# Patient Record
Sex: FEMALE | Race: WHITE | NOT HISPANIC OR LATINO | URBAN - METROPOLITAN AREA
[De-identification: names, ages, dates, MRNs, and addresses within clinical notes are randomized per-mention and may not be internally consistent; named-entity substitution may affect disease eponyms.]

---

## 2024-11-11 ENCOUNTER — TELEPHONE (OUTPATIENT)
Dept: OTOLARYNGOLOGY | Facility: HOSPITAL | Age: 84
End: 2024-11-11

## 2024-11-11 NOTE — TELEPHONE ENCOUNTER
Called patient regarding referral for cochlear implant consult. Left message for patient to contact me directly for scheduling.

## 2024-11-12 ENCOUNTER — TELEPHONE (OUTPATIENT)
Dept: OTOLARYNGOLOGY | Facility: HOSPITAL | Age: 84
End: 2024-11-12

## 2024-11-13 ENCOUNTER — TELEPHONE (OUTPATIENT)
Dept: OTOLARYNGOLOGY | Facility: HOSPITAL | Age: 84
End: 2024-11-13

## 2024-11-13 NOTE — TELEPHONE ENCOUNTER
Called patient regarding referral for cochlear implant consult. Left message for patient to contact me directly for scheduling.       X3 attempts made with no return calls. No other form of contact available to reach patient. Will await return call prior to scheduling.

## 2024-11-15 DIAGNOSIS — H90.3 BILATERAL SENSORINEURAL HEARING LOSS: ICD-10-CM

## 2024-12-02 ENCOUNTER — HOSPITAL ENCOUNTER (OUTPATIENT)
Dept: RADIOLOGY | Facility: CLINIC | Age: 84
Discharge: HOME | End: 2024-12-02
Payer: MEDICARE

## 2024-12-02 DIAGNOSIS — H90.3 BILATERAL SENSORINEURAL HEARING LOSS: ICD-10-CM

## 2024-12-02 PROCEDURE — 70480 CT ORBIT/EAR/FOSSA W/O DYE: CPT

## 2024-12-02 PROCEDURE — 70480 CT ORBIT/EAR/FOSSA W/O DYE: CPT | Performed by: RADIOLOGY

## 2024-12-17 ENCOUNTER — TELEPHONE (OUTPATIENT)
Dept: OTOLARYNGOLOGY | Facility: HOSPITAL | Age: 84
End: 2024-12-17
Payer: MEDICARE

## 2025-01-09 ENCOUNTER — APPOINTMENT (OUTPATIENT)
Dept: AUDIOLOGY | Facility: CLINIC | Age: 85
End: 2025-01-09

## 2025-01-09 DIAGNOSIS — H90.3 SENSORINEURAL HEARING LOSS, BILATERAL: Primary | ICD-10-CM

## 2025-01-09 PROCEDURE — 92626 EVAL AUD FUNCJ 1ST HOUR: CPT | Performed by: AUDIOLOGIST

## 2025-01-09 PROCEDURE — 92550 TYMPANOMETRY & REFLEX THRESH: CPT | Performed by: AUDIOLOGIST

## 2025-01-09 NOTE — PROGRESS NOTES
COCHLEAR IMPLANT EVALUATION         Name:  Shannan Tubbs  :  1940  Age:  84 y.o.  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 She has worn hearing aids for 20+ years and her most recent hearing aids were purchased about 2 years ago. She reports using captioning on the television and bluetooth on her iPhone. She used a caption phone in the past but felt it was confusing with inaccurate spelling. She reports working as a  and  but is now retired. She enjoys working in her yard and does not participate in group situations - unsure if its due to her not being able to hear well or she just doesn't enjoy it. She was referred by her audiologist for testing for cochlear implant candidacy by her audiologist, Dr. Giraldo at [facility].    Denies tinnitus, vertigo, aural fullness, pain, drainage, or history of ear surgeries. Reports hearing loss negatively impacts their quality of life.    UNAIDED TESTING:  Otoscopic Evaluation:    RIGHT:   Minimal cerumen, tympanic membrane visualized.  LEFT:   Minimal cerumen, tympanic membrane visualized.    Immittance:    Tympanometry (226 Hz probe tone) and Stapedial Acoustic Reflexes Thresholds (ART)(Probe ear):  RIGHT: Normal middle ear pressure, mobility, and ear canal volume. Ipsilateral ART absent 500-2000 Hz.  LEFT:  Normal middle ear pressure, mobility, and ear canal volume. Ipsilateral ART present at elevated sensation at 500 Hz, absent 5180-8702 Hz.    Threshold at 500 Hz is 40 dBHL and 45 dBHL in the right and left ear, respectively. See unaided testing from 10/22/2024    FUNCTIONAL AIDED TESTING:  Testing completed with Alycia Evolv AI 1600 MELISA hearing  "aids programmed to documented hearing thresholds from her dispensing audiologist at volume 4. Attempted using Phonak Leeanne P90 UP BTE hearing aids but she did not prefer the sound quality. Listening check of the hearing aids was adequate prior to testing. All testing was completed in the soundfield at 0 degrees azimuth. Pure tone testing was obtained using pulsed frequency modulated (FM) stimuli. Sentence and word recognition testing was performed with recorded material at 60 dBSPL.    LISTENING CONDITION Aided Thresholds 250-6000 Hz CNC Words (60dBA) [employer] Sentences in Quiet (60dBA) [employer] Sentences in +10 SNR 4-talker babble (S0N0) (60dBA)   RIGHT HA ONLY/Left plugged 50-NR dB HL 0% 0% 0%   LEFT HA ONLY/Right plugged 45-NR dB HL 0% 3% 1%     The Cochlear Implant Quality of Life-35 Profile (CIQOL-35 Profile) is a patient-reported outcome measure that was developed to assess the functional ability of adult cochlear users in 6 domains: Communication, Emotional, Entertainment, Environmental, Listening effort, and Social. The patient's CIQOL-10 global raw score is 30 and converted score is 46.35.     EVALUATION  See scanned Audiogram in \"Media\".    IMPRESSIONS  Shannan Tubbs has a severe to profound sensorineural hearing loss bilaterally.  Due to the lack of benefit from appropriate amplification, the patient has expressed interest in a cochlear implant. An evaluation of the right ear and left ear with appropriately fit amplification revealed 0% and 1% performance on an open-set sentence recognition test, respectively.      The audiologic findings demonstrate that Shannan Tubbs has partial residual hearing for tonal stimuli and speech detection with hearing aids, demonstrating that the auditory cranial nerve can be stimulated. Testing shows poor sentence and word understanding, even with appropriately programmed hearing aids indicating a severe to profound functional impairment.     Shannan Tubbs has an inability to orient " sounds in his environment and has even more difficulty understanding speech in noisy environments.  This is highly problematic for safety purposes and for communication. Thus, the nature of her deafness has dramatically and deleteriously affected her overall quality of life, localization, and communication. Not only bilateral severe to profound sensorineural is a FDA approved indication for cochlear implantation, but recent international consensus statement demonstrate the unquestionable benefits and need of cochlear implantation for these patients (Manjeet CA, Lulu RH, Candice DS, et al. Unilateral Cochlear Implants for Severe, Profound, or Moderate Sloping to Profound Bilateral Sensorineural Hearing Loss: A Systematic Review and Consensus Statements. [employer] [facility] [employer] Neck Surg. 2020 Aug 27. doi: 10.1001).     On this basis, unaided and aided testing indicates she is an audiologic candidate for a cochlear implant in the both ears. Shannan Tubbs and the family have been counseled regarding the post-operative cochlear implant protocol and are properly motivated and able to participate in the post cochlear implant rehabilitation program. I, and the  CI team have determined that this surgery is medically necessary for the treatment of moderate to profound sensorineural hearing loss in both ears.     Shannan Tubbs was given a complete CI packet with general information about CIs, information about follow-up and realistic expectations.  The patient reports understanding the time and effort it will take to adapt to the implant.  Shannan Tubbs reports understanding of the spectrum of auditory outcomes that may be experience (i.e. detection to comprehension). Patient was provided with the realistic expectations, pneumococcal vaccination, and aural (re)habilitation forms to complete accordingly. Patient was advised that the research team may reach out to her to determine inclusion in potential studies.     This clinician  is recommending a cochlear implantation in both ears. Patient would like to begin with a right CI but would prefer a surgery date after the summer.    RECOMMENDATIONS  Follow up with one of our neuro-otologist/CI surgeons for CI consultation.  Return to audiology for additional counseling and device selection pending approval by the  CI team.  Continue use of binaural hearing aids during all waking moments. Return to managing audiologist for earmold re-make and programming as indicated.    DENNIS Cho  Doctorate Extern    ADAM Davenport, CCC-A  Senior Clinical Audiologist    TIME: [phone]

## 2025-01-10 ENCOUNTER — APPOINTMENT (OUTPATIENT)
Dept: OTOLARYNGOLOGY | Facility: CLINIC | Age: 85
End: 2025-01-10

## 2025-02-26 ENCOUNTER — APPOINTMENT (OUTPATIENT)
Dept: OTOLARYNGOLOGY | Facility: CLINIC | Age: 85
End: 2025-02-26
Payer: MEDICARE